# Patient Record
Sex: FEMALE | Race: WHITE | NOT HISPANIC OR LATINO | URBAN - NONMETROPOLITAN AREA
[De-identification: names, ages, dates, MRNs, and addresses within clinical notes are randomized per-mention and may not be internally consistent; named-entity substitution may affect disease eponyms.]

---

## 2021-04-27 ENCOUNTER — IMPORTED ENCOUNTER (OUTPATIENT)
Dept: URBAN - NONMETROPOLITAN AREA CLINIC 1 | Facility: CLINIC | Age: 66
End: 2021-04-27

## 2021-04-27 PROBLEM — H25.13: Noted: 2021-04-27

## 2021-04-27 PROBLEM — I67.1: Noted: 2021-04-27

## 2021-04-27 PROCEDURE — S0620 ROUTINE OPHTHALMOLOGICAL EXA: HCPCS

## 2021-04-27 NOTE — PATIENT DISCUSSION
CEREBRAL ANEURYSM -2BY PT HXBEING FOLLOWED BY VASCULAR SPECIALISTNEG EYE FINDINGSMONITOR FOR CHANGESCataract OU-Not yet surgical. -Reviewed symptoms of advancing cataract growth such as glare and halos and decreased vision.-Continue to monitor for now. Pt will notify us if any new symptoms develop.

## 2021-05-26 ENCOUNTER — IMPORTED ENCOUNTER (OUTPATIENT)
Dept: URBAN - NONMETROPOLITAN AREA CLINIC 1 | Facility: CLINIC | Age: 66
End: 2021-05-26

## 2021-05-26 NOTE — PATIENT DISCUSSION
CEREBRAL ANEURYSM -2BY PT HXBEING FOLLOWED BY VASCULAR SPECIALISTNEG EYE FINDINGSMONITOR FOR CHANGESCataract OU-Not yet surgical. -Reviewed symptoms of advancing cataract growth such as glare and halos and decreased vision.-Continue to monitor for now. Pt will notify us if any new symptoms develop. 5/26/21 Remake lenses.

## 2022-04-10 ASSESSMENT — VISUAL ACUITY
OD_SC: 20/40
OD_CC: J1
OS_CC: J1
OS_SC: 20/60

## 2022-04-29 ENCOUNTER — ESTABLISHED PATIENT (OUTPATIENT)
Dept: RURAL CLINIC 1 | Facility: CLINIC | Age: 67
End: 2022-04-29

## 2022-04-29 DIAGNOSIS — H25.13: ICD-10-CM

## 2022-04-29 PROCEDURE — 92014 COMPRE OPH EXAM EST PT 1/>: CPT

## 2022-04-29 ASSESSMENT — VISUAL ACUITY
OS_CC: 20/25
OD_CC: 20/25

## 2022-04-29 ASSESSMENT — TONOMETRY
OS_IOP_MMHG: 17
OD_IOP_MMHG: 16

## 2024-06-05 ENCOUNTER — COMPREHENSIVE EXAM (OUTPATIENT)
Dept: RURAL CLINIC 1 | Facility: CLINIC | Age: 69
End: 2024-06-05

## 2024-06-05 DIAGNOSIS — H25.13: ICD-10-CM

## 2024-06-05 PROCEDURE — 92015 DETERMINE REFRACTIVE STATE: CPT

## 2024-06-05 PROCEDURE — 92014 COMPRE OPH EXAM EST PT 1/>: CPT

## 2024-06-05 ASSESSMENT — TONOMETRY
OD_IOP_MMHG: 17
OS_IOP_MMHG: 17

## 2024-06-05 ASSESSMENT — VISUAL ACUITY
OU_CC: 20/30
OD_CC: 20/40
OS_CC: 20/40